# Patient Record
Sex: MALE | Race: WHITE | Employment: STUDENT | URBAN - METROPOLITAN AREA
[De-identification: names, ages, dates, MRNs, and addresses within clinical notes are randomized per-mention and may not be internally consistent; named-entity substitution may affect disease eponyms.]

---

## 2018-10-06 ENCOUNTER — APPOINTMENT (OUTPATIENT)
Dept: GENERAL RADIOLOGY | Age: 23
End: 2018-10-06
Payer: COMMERCIAL

## 2018-10-06 ENCOUNTER — HOSPITAL ENCOUNTER (EMERGENCY)
Age: 23
Discharge: HOME | End: 2018-10-06
Attending: EMERGENCY MEDICINE
Payer: COMMERCIAL

## 2018-10-06 VITALS
BODY MASS INDEX: 20.51 KG/M2 | DIASTOLIC BLOOD PRESSURE: 74 MMHG | WEIGHT: 165 LBS | TEMPERATURE: 98.2 F | OXYGEN SATURATION: 98 % | RESPIRATION RATE: 18 BRPM | HEIGHT: 75 IN | SYSTOLIC BLOOD PRESSURE: 125 MMHG | HEART RATE: 68 BPM

## 2018-10-06 DIAGNOSIS — S52.502A CLOSED FRACTURE OF DISTAL END OF LEFT RADIUS, UNSPECIFIED FRACTURE MORPHOLOGY, INITIAL ENCOUNTER: Primary | ICD-10-CM

## 2018-10-06 PROCEDURE — 6370000000 HC RX 637 (ALT 250 FOR IP): Performed by: EMERGENCY MEDICINE

## 2018-10-06 PROCEDURE — 73090 X-RAY EXAM OF FOREARM: CPT

## 2018-10-06 PROCEDURE — 73110 X-RAY EXAM OF WRIST: CPT

## 2018-10-06 PROCEDURE — 29125 APPL SHORT ARM SPLINT STATIC: CPT

## 2018-10-06 PROCEDURE — 99283 EMERGENCY DEPT VISIT LOW MDM: CPT

## 2018-10-06 RX ORDER — HYDROCODONE BITARTRATE AND ACETAMINOPHEN 5; 325 MG/1; MG/1
1 TABLET ORAL EVERY 4 HOURS PRN
Qty: 18 TABLET | Refills: 0 | Status: SHIPPED | OUTPATIENT
Start: 2018-10-06 | End: 2018-10-09

## 2018-10-06 RX ORDER — HYDROCODONE BITARTRATE AND ACETAMINOPHEN 5; 325 MG/1; MG/1
1 TABLET ORAL ONCE
Status: COMPLETED | OUTPATIENT
Start: 2018-10-06 | End: 2018-10-06

## 2018-10-06 RX ADMIN — HYDROCODONE BITARTRATE AND ACETAMINOPHEN 1 TABLET: 5; 325 TABLET ORAL at 13:24

## 2018-10-06 ASSESSMENT — PAIN DESCRIPTION - FREQUENCY
FREQUENCY: CONTINUOUS
FREQUENCY: CONTINUOUS

## 2018-10-06 ASSESSMENT — PAIN DESCRIPTION - PAIN TYPE
TYPE: ACUTE PAIN
TYPE: ACUTE PAIN

## 2018-10-06 ASSESSMENT — PAIN DESCRIPTION - LOCATION
LOCATION: ARM
LOCATION: ARM

## 2018-10-06 ASSESSMENT — PAIN DESCRIPTION - ORIENTATION
ORIENTATION: LEFT
ORIENTATION: LEFT

## 2018-10-06 ASSESSMENT — PAIN DESCRIPTION - PROGRESSION: CLINICAL_PROGRESSION: NOT CHANGED

## 2018-10-06 ASSESSMENT — PAIN - FUNCTIONAL ASSESSMENT: PAIN_FUNCTIONAL_ASSESSMENT: 0-10

## 2018-10-06 ASSESSMENT — PAIN SCALES - GENERAL
PAINLEVEL_OUTOF10: 5
PAINLEVEL_OUTOF10: 10

## 2018-10-06 ASSESSMENT — PAIN DESCRIPTION - DESCRIPTORS
DESCRIPTORS: ACHING;SORE
DESCRIPTORS: SHARP;SORE;SHOOTING

## 2018-10-06 ASSESSMENT — PAIN DESCRIPTION - ONSET: ONSET: SUDDEN

## 2018-10-06 NOTE — ED NOTES
Ulnar splint applied by [facility], MSP present and intact. Applied arm sling.       Alvarado Block RN  10/06/18 1277

## 2018-10-06 NOTE — ED PROVIDER NOTES
[address] [place] [address] ED  eMERGENCY dEPARTMENT eNCOUnter      Pt Name: Justine Hi  MRN: [id number]  [place] 1995  Date of evaluation: 10/6/2018  Provider: Bridget Byrd MD    CHIEF COMPLAINT       Chief Complaint   Patient presents with    Arm Injury     left forearm injury while playing rugby. HISTORY OF PRESENT ILLNESS   (Location/Symptom, Timing/Onset, Context/Setting, Quality, Duration, Modifying Factors, Severity)  Note limiting factors. Justine Hi is a 23 y.o. male who presents to the emergency department After injuring the left wrist area during a fall while warming up for right be gained. He complains pain to the distal radius region. The local region is tingly but the fingers and hand are nontender there is no weakness. No pain to the shoulder and clavicle or proximally. No pain to the hand or fingers. No skin wounds. No neurologic or vascular complaints. The history is provided by the patient. Nursing Notes were reviewed. REVIEW OF SYSTEMS    (2-9 systems for level 4, 10 or more for level 5)     Review of Systems   Musculoskeletal: Negative for joint swelling. Skin: Negative for wound. Neurological: Negative for weakness and numbness. Except as noted above the remainder of the review of systems was reviewed and negative. PAST MEDICAL HISTORY   History reviewed. No pertinent past medical history. SURGICAL HISTORY     History reviewed. No pertinent surgical history. CURRENT MEDICATIONS       Previous Medications    No medications on file       ALLERGIES     Patient has no known allergies. FAMILY HISTORY     History reviewed. No pertinent family history.        SOCIAL HISTORY       Social History     Social History    Marital status: N/A     Spouse name: N/A    Number of children: N/A    Years of education: N/A     Social History Main Topics    Smoking status: Never Smoker    Smokeless tobacco: Never Used    Alcohol use No    Drug VIEWS)   Final Result   TRANSVERSE MINIMALLY DISPLACED FRACTURE OF THE DISTAL METADIAPHYSEAL REGION OF THE LEFT DISTAL RADIUS. EXAMINATION: LEFT FOREARM       CLINICAL HISTORY: Generalized pain after sports injury      COMPARISONS: None. FINDINGS:      Two views of the left forearm are submitted. Transverse minimally displaced fracture of the distal metadiaphyseal region of the left distal radius                                                                                  IMPRESSION: TRANSVERSE MINIMALLY DISPLACED FRACTURE OF THE DISTAL METADIAPHYSEAL REGION OF THE LEFT DISTAL RADIUS            ED BEDSIDE ULTRASOUND:   Performed by ED Physician - none    LABS:  Labs Reviewed - No data to display    All other labs were within normal range or not returned as of this dictation. EMERGENCY DEPARTMENT COURSE and DIFFERENTIAL DIAGNOSIS/MDM:   Vitals:    Vitals:    10/06/18 1311   BP: 125/74   Pulse: 68   Resp: 18   Temp: 98.2 °F (36.8 °C)   TempSrc: Oral   SpO2: 98%   Weight: 165 lb (74.8 kg)   Height: 6' 3\" (1.905 m)       Oral Vicodin given for pain. Fiberglass volar splint placed. Sling. Prescription for hydrocodone for pain relief. Orthopedic referral given. MDM    CRITICAL CARE TIME   Total Critical Care time was  minutes, excluding separately reportable procedures. There was a high probability of clinically significant/life threatening deterioration in the patient's condition which required my urgent intervention. CONSULTS:  None    PROCEDURES:  Unless otherwise noted below, none     Procedures    FINAL IMPRESSION      1.  Closed fracture of distal end of left radius, unspecified fracture morphology, initial encounter          DISPOSITION/PLAN   DISPOSITION Decision To Discharge 10/06/2018 01:45:03 PM      PATIENT REFERRED TO:  Chente Kebede MD  [address]  [facility] [id number]    Schedule an appointment as soon as possible for a visit       Chente Kebede MD  [address]

## 2018-10-17 ENCOUNTER — HOSPITAL ENCOUNTER (OUTPATIENT)
Dept: GENERAL RADIOLOGY | Age: 23
Discharge: HOME | End: 2018-10-19
Payer: COMMERCIAL

## 2018-10-17 DIAGNOSIS — S52.502D CLOSED FRACTURE OF DISTAL END OF LEFT RADIUS WITH ROUTINE HEALING, UNSPECIFIED FRACTURE MORPHOLOGY, SUBSEQUENT ENCOUNTER: ICD-10-CM

## 2018-10-17 PROCEDURE — 73100 X-RAY EXAM OF WRIST: CPT

## 2018-10-31 ENCOUNTER — HOSPITAL ENCOUNTER (OUTPATIENT)
Dept: GENERAL RADIOLOGY | Age: 23
Discharge: HOME | End: 2018-11-02
Payer: COMMERCIAL

## 2018-10-31 DIAGNOSIS — S52.502D CLOSED FRACTURE OF DISTAL END OF LEFT RADIUS WITH ROUTINE HEALING, UNSPECIFIED FRACTURE MORPHOLOGY, SUBSEQUENT ENCOUNTER: ICD-10-CM

## 2018-10-31 PROCEDURE — 73110 X-RAY EXAM OF WRIST: CPT

## 2018-11-13 ENCOUNTER — HOSPITAL ENCOUNTER (OUTPATIENT)
Dept: PHYSICAL THERAPY | Age: 23
Setting detail: THERAPIES SERIES
Discharge: HOME | End: 2018-11-13
Payer: COMMERCIAL

## 2018-11-13 PROCEDURE — G8984 CARRY CURRENT STATUS: HCPCS

## 2018-11-13 PROCEDURE — 97161 PT EVAL LOW COMPLEX 20 MIN: CPT

## 2018-11-13 PROCEDURE — G8985 CARRY GOAL STATUS: HCPCS

## 2018-11-13 PROCEDURE — 97110 THERAPEUTIC EXERCISES: CPT

## 2018-11-13 NOTE — PROGRESS NOTES
Physical Therapy  Initial Assessment  Date: 2018  Patient Name: Yunier Perea  MRN: [id number]  : 1995     Treatment Diagnosis: L wrist stiffness and weakness    Restrictions  Restrictions/Precautions  Restrictions/Precautions:  (do not lift >2#)  Position Activity Restriction  Other position/activity restrictions: wrist support     Subjective   General  Chart Reviewed: Yes  Patient assessed for rehabilitation services?: Yes  Additional Pertinent Hx: Healthy, active  Family / Caregiver Present: No  Referring Practitioner: Sudheer Silva  Diagnosis: L wrist fx  Other (Comment): Longarm cast x 1.5 weeks, short arm x 2 weeks and removed last week  PT Visit Information  Onset Date: 10/13/18  Total # of Visits to Date: 1  No Show: 0  Canceled Appointment: 0  Subjective  Subjective: Pt. states he isn't having much pain, but notes stiffness  Pain Screening  Patient Currently in Pain: No  Vital Signs  Patient Currently in Pain: No    Vision/Hearing       Orientation       Social/Functional History  Social/Functional History  Lives With: Alone  Type of Home:  (dorm)  Type of occupation: full time student at [employer]  Objective          PROM RUE (degrees)  RUE PROM: Exceptions  R Wrist Flex 0-80: 0-80  R Wrist Ext 0-70: 0-70  PROM LUE (degrees)  LUE PROM: Exceptions  L Wrist Flex 0-80: 0-45  L Wrist Ext 0-70: 0-70    Strength RUE  Comment: R hand , position 2, 75#; Key 22#, 3 pt. pinch 17#  Strength LUE  Comment: L hand , position 2 35#, key 14#, 3 pt. pinch 10#                       Exercises  Exercise 1: reverse phalens stretch for wrist flexion, 30 sec x 2  Exercise 2: manual wrist flexion, arm extended, 30 sec x 2  Exercise 3: finger extension with rubber bands                 L Fingers  Left Finger Strength Comment: weakness wtih easy break on OK sign  R Fingers  Right Finger Strength Comment: able to hold OK against resistance    Assessment   Conditions Requiring Skilled Therapeutic

## 2018-11-20 ENCOUNTER — HOSPITAL ENCOUNTER (OUTPATIENT)
Dept: PHYSICAL THERAPY | Age: 23
Setting detail: THERAPIES SERIES
Discharge: HOME | End: 2018-11-20
Payer: COMMERCIAL

## 2018-11-20 PROCEDURE — 97140 MANUAL THERAPY 1/> REGIONS: CPT

## 2018-11-20 PROCEDURE — 97110 THERAPEUTIC EXERCISES: CPT

## 2018-11-27 ENCOUNTER — HOSPITAL ENCOUNTER (OUTPATIENT)
Dept: PHYSICAL THERAPY | Age: 23
Setting detail: THERAPIES SERIES
Discharge: HOME | End: 2018-11-27
Payer: COMMERCIAL

## 2018-11-27 PROCEDURE — 97110 THERAPEUTIC EXERCISES: CPT

## 2018-11-27 PROCEDURE — 97140 MANUAL THERAPY 1/> REGIONS: CPT

## 2018-11-28 ENCOUNTER — HOSPITAL ENCOUNTER (OUTPATIENT)
Dept: GENERAL RADIOLOGY | Age: 23
Discharge: HOME | End: 2018-11-30
Payer: COMMERCIAL

## 2018-11-28 DIAGNOSIS — S52.592D OTHER CLOSED FRACTURE OF DISTAL END OF LEFT RADIUS WITH ROUTINE HEALING, SUBSEQUENT ENCOUNTER: ICD-10-CM

## 2018-11-28 PROCEDURE — 73100 X-RAY EXAM OF WRIST: CPT

## 2018-12-04 ENCOUNTER — HOSPITAL ENCOUNTER (OUTPATIENT)
Dept: PHYSICAL THERAPY | Age: 23
Setting detail: THERAPIES SERIES
Discharge: HOME | End: 2018-12-04
Payer: COMMERCIAL

## 2018-12-04 PROCEDURE — 97110 THERAPEUTIC EXERCISES: CPT

## 2018-12-04 PROCEDURE — 97140 MANUAL THERAPY 1/> REGIONS: CPT

## 2018-12-04 NOTE — PROGRESS NOTES
Modalities  Cryotherapy (Minutes\Location): x10 min post to L wrist   Manual therapy  Joint mobilization: mild distraction 5th digit to decrease pain   PROM: Passive stretching L wrist to improve ROM                           Assessment:   Conditions Requiring Skilled Therapeutic Intervention  Body structures, Functions, Activity limitations: Decreased ROM; Decreased strength;Decreased fine motor control  Assessment: Pt. progressing with L  strength inc 20.67# since eval and inc 2 # with pincer  since eval.  Pt. also improving with L wrist flexion only lacking 2 deg from LTG. Performed  strengthening therex with Green (medium resistance) putty to advance  strength. Pt. also able to tolerate wrist strengthening therex with 1# wieght and YTB with \"mild soreness\". Pt. able to tolerate  strengthening without pain using lime putty except for ooposition  with thumb and pinky inc pain to 5/10. Distraction to releive pain. Pt. states \"alittle sore\". Issued CP post to address pain and assit with muscle recovery. Treatment Diagnosis: L wrist stiffness and weakness  Prognosis: Excellent  Patient Education: Issued Lime Putty   REQUIRES PT FOLLOW UP: Yes      G-Code:     OutComes Score                                           Goals:  Short term goals  Time Frame for Short term goals: STG=LTG  Short term goal 1: Imporve L wrist flexion to >70 deg.  to allow improved function  Short term goal 2: Improve L hand  and pinch strength to within 10% of RUE to allow improved function and return to previous activities  Short term goal 3: independent with HEP for hand and fine motor strength  Short term goal 4: demo ability to lift and carry object in bilateral UE >10# without difficulty  Patient Goals   Patient goals : improve ROM and strength of the L hand/wrist    Plan:      Plan  Times per week: 1-2  Plan weeks: 4-6  Specific instructions for Next Treatment: review stretching, gentle ROM  Current

## 2018-12-06 ENCOUNTER — HOSPITAL ENCOUNTER (OUTPATIENT)
Dept: PHYSICAL THERAPY | Age: 23
Setting detail: THERAPIES SERIES
Discharge: HOME | End: 2018-12-06
Payer: COMMERCIAL

## 2018-12-06 PROCEDURE — 97110 THERAPEUTIC EXERCISES: CPT

## 2018-12-13 ENCOUNTER — HOSPITAL ENCOUNTER (OUTPATIENT)
Dept: PHYSICAL THERAPY | Age: 23
Setting detail: THERAPIES SERIES
Discharge: HOME | End: 2018-12-13
Payer: COMMERCIAL

## 2018-12-13 PROCEDURE — 97110 THERAPEUTIC EXERCISES: CPT

## 2018-12-13 PROCEDURE — G8986 CARRY D/C STATUS: HCPCS

## 2018-12-13 PROCEDURE — 97140 MANUAL THERAPY 1/> REGIONS: CPT

## 2018-12-13 PROCEDURE — G8985 CARRY GOAL STATUS: HCPCS

## 2018-12-13 NOTE — PROGRESS NOTES
remains less than his R side, however he is also R side dominant. He has putty and bands for home and plans to resume regular exercise. Good awareness of strength and limitations and safety awareness with easing back into more weight bearing and strenuous activities. Pt.is heading home for break next week and feel he is appropriate for discharge with continued HEP at this time. Treatment Diagnosis: L wrist stiffness and weakness  Prognosis: Excellent  Patient Education: continue HEP- putty, bands; ok to start light weights, but resistance low. Add planks for short intervals, 10-15 sec; wall pushups  Barriers to Learning: none  REQUIRES PT FOLLOW UP: No      G-Code:  PT G-Codes  Functional Assessment Tool Used: professional judgement,  strength  Functional Limitation: Carrying, moving and handling objects  Carrying, Moving and Handling Objects Goal Status (): At least 1 percent but less than 20 percent impaired, limited or restricted  Carrying, Moving and Handling Objects Discharge Status (): At least 20 percent but less than 40 percent impaired, limited or restricted  OutComes Score                                           Goals:  Short term goals  Time Frame for Short term goals: STG=LTG  Short term goal 1: Imporve L wrist flexion to >70 deg.  to allow improved function-Partially met, up to 65 deg  Short term goal 2: Improve L hand  and pinch strength to within 10% of RUE to allow improved function and return to previous activities-partially met, all key strength improved but ~20% of R side; pt. has been returning to usual activities with only fatigue reported, feels it is improving  Short term goal 3: independent with HEP for hand and fine motor strength-MET  Short term goal 4: demo ability to lift and carry object in bilateral UE >10# without difficulty-GOAL MET  Patient Goals   Patient goals : improve ROM and strength of the L hand/wrist    Plan:             Therapy Time   Individual

## 2024-03-22 ENCOUNTER — APPOINTMENT (OUTPATIENT)
Dept: OTOLARYNGOLOGY | Facility: CLINIC | Age: 29
End: 2024-03-22

## 2024-03-22 PROBLEM — Z00.00 ENCOUNTER FOR PREVENTIVE HEALTH EXAMINATION: Status: ACTIVE | Noted: 2024-03-22
